# Patient Record
Sex: FEMALE | Race: OTHER | Employment: FULL TIME | URBAN - METROPOLITAN AREA
[De-identification: names, ages, dates, MRNs, and addresses within clinical notes are randomized per-mention and may not be internally consistent; named-entity substitution may affect disease eponyms.]

---

## 2023-10-05 ENCOUNTER — HOSPITAL ENCOUNTER (OUTPATIENT)
Age: 43
Discharge: ANOTHER HOSPITAL | End: 2023-10-05
Payer: MEDICAID

## 2023-10-05 VITALS
SYSTOLIC BLOOD PRESSURE: 134 MMHG | OXYGEN SATURATION: 99 % | RESPIRATION RATE: 18 BRPM | HEART RATE: 66 BPM | TEMPERATURE: 98 F | DIASTOLIC BLOOD PRESSURE: 76 MMHG

## 2023-10-05 DIAGNOSIS — R51.9 INTRACTABLE HEADACHE, UNSPECIFIED CHRONICITY PATTERN, UNSPECIFIED HEADACHE TYPE: Primary | ICD-10-CM

## 2023-10-05 DIAGNOSIS — H53.8 BLURRED VISION: ICD-10-CM

## 2023-10-05 NOTE — ED INITIAL ASSESSMENT (HPI)
Pt presents with son with c/o intermittent headaches 8/10 pain and dizziness beginning Monday. Denies nausea, LOC or visual changes.